# Patient Record
Sex: FEMALE | Employment: STUDENT | URBAN - METROPOLITAN AREA
[De-identification: names, ages, dates, MRNs, and addresses within clinical notes are randomized per-mention and may not be internally consistent; named-entity substitution may affect disease eponyms.]

---
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
## 2024-04-30 ENCOUNTER — HOSPITAL ENCOUNTER (OUTPATIENT)
Age: 19
Discharge: HOME | End: 2024-04-30
Payer: COMMERCIAL

## 2024-04-30 VITALS
RESPIRATION RATE: 18 BRPM | TEMPERATURE: 98 F | DIASTOLIC BLOOD PRESSURE: 65 MMHG | OXYGEN SATURATION: 100 % | HEART RATE: 74 BPM | SYSTOLIC BLOOD PRESSURE: 110 MMHG

## 2024-04-30 DIAGNOSIS — Z48.02 ENCOUNTER FOR REMOVAL OF SUTURES: Primary | ICD-10-CM

## 2024-04-30 PROCEDURE — 99211 OFF/OP EST MAY X REQ PHY/QHP: CPT

## 2024-04-30 PROCEDURE — 99202 OFFICE O/P NEW SF 15 MIN: CPT

## 2024-04-30 NOTE — ED PROVIDER NOTES
Chief Complaint   Patient presents with    Sut Stap RingRemoval       HPI:     Laly Niño is a 18 year old female who presents for evaluation of suture removal.  Patient states sutures placed on the left thumb last week through the emergency department.  Denies any complications including redness warmth or difficulty with mobility.      PFSH    PFS asessment screens reviewed and agree.  Nurses notes reviewed I agree with documentation.    Family History   Problem Relation Age of Onset    Cancer Mother         Thyroid CA    Diabetes Paternal Grandmother     Heart Disease Paternal Grandmother     High Blood Pressure Paternal Grandmother     Diabetes Paternal Grandfather     Heart Disease Paternal Grandfather     High Cholesterol Paternal Grandfather     High Blood Pressure Paternal Grandfather     Diabetes Other         PGGMA, PG-Aunt, PG-Uncle    Heart Disease Maternal Grandmother     High Cholesterol Maternal Grandmother     High Blood Pressure Maternal Grandmother      Family history reviewed with patient/caregiver and is not pertinent to presenting problem.  Social History     Socioeconomic History    Marital status: Single     Spouse name: Not on file    Number of children: Not on file    Years of education: Not on file    Highest education level: Not on file   Occupational History    Not on file   Tobacco Use    Smoking status: Never    Smokeless tobacco: Never   Vaping Use    Vaping status: Never Used   Substance and Sexual Activity    Alcohol use: No    Drug use: No    Sexual activity: Not on file   Other Topics Concern    Not on file   Social History Narrative    Not on file     Social Determinants of Health     Financial Resource Strain: Not on file   Food Insecurity: Not on file   Transportation Needs: Not on file   Physical Activity: Not on file   Stress: Not on file   Social Connections: Not on file   Housing Stability: Not on file         ROS:   Positive for stated complaint: Suture  removal.  All other systems reviewed and negative except as noted above.  Constitutional and Vital Signs Reviewed.      Physical Exam:     Findings:    There were no vitals taken for this visit.  GENERAL: well developed, well nourished, well hydrated, no distress  SKIN: good skin turgor, no obvious rashes  EXTREMITIES: Well-approximated laceration along the proximal left thumb palmar aspect.  No erythema no warmth no dehiscence.  No cyanosis or edema. ROBERTS without difficulty  NEURO: No focal deficits  PSYCH: Alert and oriented x3.  Answering questions appropriately.  Mood appropriate.    MDM/Assessment/Plan:   Orders for this encounter:    No orders of the defined types were placed in this encounter.      Labs performed this visit:  No results found for this or any previous visit (from the past 10 hour(s)).    MDM:  Sutures removed with pickup and scissors without complication, bandage applied.  Instructed on outpatient follow-up as needed    Diagnosis:    ICD-10-CM    1. Encounter for removal of sutures  Z48.02           All results reviewed and discussed with patient.  See AVS for detailed discharge instructions for your condition today.    Follow Up with:  Rad Hall MD  [address] [place] [id number] [phone]      As needed

## 2024-10-08 ENCOUNTER — LAB ENCOUNTER (OUTPATIENT)
Dept: LAB | Age: 19
End: 2024-10-08
Attending: FAMILY MEDICINE
Payer: COMMERCIAL

## 2024-10-08 ENCOUNTER — OFFICE VISIT (OUTPATIENT)
Dept: FAMILY MEDICINE CLINIC | Facility: CLINIC | Age: 19
End: 2024-10-08

## 2024-10-08 VITALS
HEIGHT: 62.5 IN | BODY MASS INDEX: 25.84 KG/M2 | WEIGHT: 144 LBS | HEART RATE: 103 BPM | TEMPERATURE: 98 F | DIASTOLIC BLOOD PRESSURE: 76 MMHG | SYSTOLIC BLOOD PRESSURE: 114 MMHG

## 2024-10-08 DIAGNOSIS — Z23 NEED FOR PNEUMOCOCCAL 20-VALENT CONJUGATE VACCINATION: ICD-10-CM

## 2024-10-08 DIAGNOSIS — Z00.00 WELL ADULT EXAM: Primary | ICD-10-CM

## 2024-10-08 DIAGNOSIS — N92.6 IRREGULAR MENSES: ICD-10-CM

## 2024-10-08 DIAGNOSIS — Z00.00 WELL ADULT EXAM: ICD-10-CM

## 2024-10-08 DIAGNOSIS — J45.30 MILD PERSISTENT ASTHMA, UNSPECIFIED WHETHER COMPLICATED (HCC): ICD-10-CM

## 2024-10-08 LAB
ALBUMIN SERPL-MCNC: 4.9 G/DL (ref 3.2–4.8)
ALBUMIN/GLOB SERPL: 1.8 {RATIO} (ref 1–2)
ALP LIVER SERPL-CCNC: 55 U/L
ALT SERPL-CCNC: 35 U/L
ANION GAP SERPL CALC-SCNC: 5 MMOL/L (ref 0–18)
AST SERPL-CCNC: 40 U/L (ref ?–34)
BASOPHILS # BLD AUTO: 0.06 X10(3) UL (ref 0–0.2)
BASOPHILS NFR BLD AUTO: 0.9 %
BILIRUB SERPL-MCNC: 0.3 MG/DL (ref 0.3–1.2)
BUN BLD-MCNC: 11 MG/DL (ref 9–23)
BUN/CREAT SERPL: 12.8 (ref 10–20)
CALCIUM BLD-MCNC: 9.7 MG/DL (ref 8.7–10.4)
CHLORIDE SERPL-SCNC: 110 MMOL/L (ref 98–112)
CHOLEST SERPL-MCNC: 168 MG/DL (ref ?–200)
CO2 SERPL-SCNC: 27 MMOL/L (ref 21–32)
CREAT BLD-MCNC: 0.86 MG/DL
DEPRECATED RDW RBC AUTO: 41 FL (ref 35.1–46.3)
EGFRCR SERPLBLD CKD-EPI 2021: 99 ML/MIN/1.73M2 (ref 60–?)
EOSINOPHIL # BLD AUTO: 0.1 X10(3) UL (ref 0–0.7)
EOSINOPHIL NFR BLD AUTO: 1.4 %
ERYTHROCYTE [DISTWIDTH] IN BLOOD BY AUTOMATED COUNT: 12.4 % (ref 11–15)
EST. AVERAGE GLUCOSE BLD GHB EST-MCNC: 105 MG/DL (ref 68–126)
FASTING PATIENT LIPID ANSWER: YES
FASTING STATUS PATIENT QL REPORTED: YES
GLOBULIN PLAS-MCNC: 2.7 G/DL (ref 2–3.5)
GLUCOSE BLD-MCNC: 98 MG/DL (ref 70–99)
HBA1C MFR BLD: 5.3 % (ref ?–5.7)
HCT VFR BLD AUTO: 40.8 %
HDLC SERPL-MCNC: 48 MG/DL (ref 40–59)
HGB BLD-MCNC: 13.5 G/DL
IMM GRANULOCYTES # BLD AUTO: 0.01 X10(3) UL (ref 0–1)
IMM GRANULOCYTES NFR BLD: 0.1 %
LDLC SERPL CALC-MCNC: 112 MG/DL (ref ?–100)
LYMPHOCYTES # BLD AUTO: 1.28 X10(3) UL (ref 1–4)
LYMPHOCYTES NFR BLD AUTO: 18.4 %
MCH RBC QN AUTO: 30.3 PG (ref 26–34)
MCHC RBC AUTO-ENTMCNC: 33.1 G/DL (ref 31–37)
MCV RBC AUTO: 91.7 FL
MONOCYTES # BLD AUTO: 0.41 X10(3) UL (ref 0.1–1)
MONOCYTES NFR BLD AUTO: 5.9 %
NEUTROPHILS # BLD AUTO: 5.11 X10 (3) UL (ref 1.5–7.7)
NEUTROPHILS # BLD AUTO: 5.11 X10(3) UL (ref 1.5–7.7)
NEUTROPHILS NFR BLD AUTO: 73.3 %
NONHDLC SERPL-MCNC: 120 MG/DL (ref ?–130)
OSMOLALITY SERPL CALC.SUM OF ELEC: 293 MOSM/KG (ref 275–295)
PLATELET # BLD AUTO: 288 10(3)UL (ref 150–450)
POTASSIUM SERPL-SCNC: 4.4 MMOL/L (ref 3.5–5.1)
PROT SERPL-MCNC: 7.6 G/DL (ref 5.7–8.2)
RBC # BLD AUTO: 4.45 X10(6)UL
SODIUM SERPL-SCNC: 142 MMOL/L (ref 136–145)
TRIGL SERPL-MCNC: 36 MG/DL (ref 30–149)
TSI SER-ACNC: 2.12 MIU/ML (ref 0.55–4.78)
VLDLC SERPL CALC-MCNC: 6 MG/DL (ref 0–30)
WBC # BLD AUTO: 7 X10(3) UL (ref 4–11)

## 2024-10-08 PROCEDURE — 80061 LIPID PANEL: CPT

## 2024-10-08 PROCEDURE — 80053 COMPREHEN METABOLIC PANEL: CPT

## 2024-10-08 PROCEDURE — 36415 COLL VENOUS BLD VENIPUNCTURE: CPT

## 2024-10-08 PROCEDURE — 83036 HEMOGLOBIN GLYCOSYLATED A1C: CPT

## 2024-10-08 PROCEDURE — 84443 ASSAY THYROID STIM HORMONE: CPT

## 2024-10-08 PROCEDURE — 85025 COMPLETE CBC W/AUTO DIFF WBC: CPT

## 2024-10-08 RX ORDER — FLUTICASONE PROPIONATE AND SALMETEROL 100; 50 UG/1; UG/1
1 POWDER RESPIRATORY (INHALATION) 2 TIMES DAILY
Qty: 1 EACH | Refills: 0 | Status: SHIPPED | OUTPATIENT
Start: 2024-10-08

## 2024-10-08 NOTE — PROGRESS NOTES
HPI:    Patient ID: Laly Niño is a 19 year old female.    HPI  Chief Complaint   Patient presents with    Routine Physical    Irregular Periods       Wt Readings from Last 6 Encounters:   10/08/24 144 lb (65.3 kg)   03/02/22 133 lb (60.3 kg) (67%, Z= 0.44)*   12/31/20 144 lb (65.3 kg) (82%, Z= 0.93)*   03/02/20 144 lb 12.8 oz (65.7 kg) (85%, Z= 1.03)*   10/21/19 129 lb 9.6 oz (58.8 kg) (71%, Z= 0.57)*   05/09/19 121 lb 11.1 oz (55.2 kg) (63%, Z= 0.33)*     * Growth percentiles are based on [facility] (Girls, 2-20 Years) data.     BP Readings from Last 3 Encounters:   10/08/24 114/76   04/30/24 110/65   03/02/22 101/67 (18%, Z = -0.92 /  62%, Z = 0.31)*     *BP percentiles are based on the 2017 AAP Clinical Practice Guideline for girls     Started working out and noticed her albuterol not helping much.  Using it 15 mins prior  Non smoker/ vaping.    Single  Never been sexually active     Review of Systems   Constitutional:  Negative for activity change, appetite change, chills, fatigue, fever and unexpected weight change.   HENT:  Negative for congestion, dental problem, drooling, ear discharge, ear pain, facial swelling, hearing loss, mouth sores, nosebleeds, postnasal drip, rhinorrhea, sinus pressure, sinus pain, sneezing, sore throat, tinnitus, trouble swallowing and voice change.    Eyes:  Negative for pain, discharge, redness and visual disturbance.   Respiratory:  Negative for cough, shortness of breath and wheezing.    Cardiovascular:  Negative for chest pain, palpitations and leg swelling.   Gastrointestinal:  Negative for abdominal pain, anal bleeding, blood in stool, constipation, diarrhea, nausea, rectal pain and vomiting.   Endocrine: Negative for cold intolerance, heat intolerance, polydipsia, polyphagia and polyuria.   Genitourinary:  Negative for decreased urine volume, difficulty urinating, dysuria, flank pain, frequency, menstrual problem, pelvic pain, urgency, vaginal bleeding, vaginal discharge  and vaginal pain.        Menses irregular     Musculoskeletal:  Negative for arthralgias, back pain and myalgias.   Skin:  Negative for rash.   Neurological:  Negative for dizziness, seizures, syncope, weakness, numbness and headaches.   Hematological:  Does not bruise/bleed easily.   Psychiatric/Behavioral:  Negative for behavioral problems, decreased concentration, self-injury, sleep disturbance and suicidal ideas. The patient is not nervous/anxious.        /76   Pulse 103   Temp 98.1 °F (36.7 °C) (Temporal)   Ht 5' 2.5\" (1.588 m)   Wt 144 lb (65.3 kg)   LMP 10/01/2024 (Exact Date)   BMI 25.92 kg/m²     Past Medical History:    Asthma (HCC)    has had it for years      History reviewed. No pertinent surgical history.  Social History     Socioeconomic History    Marital status: Single     Spouse name: Not on file    Number of children: Not on file    Years of education: Not on file    Highest education level: Not on file   Occupational History    Not on file   Tobacco Use    Smoking status: Never    Smokeless tobacco: Never   Vaping Use    Vaping status: Never Used   Substance and Sexual Activity    Alcohol use: No    Drug use: No    Sexual activity: Not on file   Other Topics Concern    Not on file   Social History Narrative    Not on file     Social Determinants of Health     Financial Resource Strain: Not on file   Food Insecurity: Not on file   Transportation Needs: Not on file   Physical Activity: Not on file   Stress: Not on file   Social Connections: Not on file   Housing Stability: Not on file     Family History   Problem Relation Age of Onset    Cancer Mother         Thyroid CA    Diabetes Paternal Grandmother     Heart Disease Paternal Grandmother     High Blood Pressure Paternal Grandmother     Diabetes Paternal Grandfather     Heart Disease Paternal Grandfather     High Cholesterol Paternal Grandfather     High Blood Pressure Paternal Grandfather     Diabetes Other         PGGMA, PG-Aunt,  PG-Uncle    Heart Disease Maternal Grandmother     High Cholesterol Maternal Grandmother     High Blood Pressure Maternal Grandmother        Immunization History   Administered Date(s) Administered    Covid-19 Vaccine Pfizer 30 mcg/0.3 ml 08/05/2021, 11/05/2021    DTAP 08/05/2004, 08/05/2004, 11/17/2004, 11/17/2004, 03/06/2008, 03/06/2008, 08/19/2009, 08/19/2009    FLULAVAL 6 months & older 0.5 ml Prefilled syringe (52608) 10/09/2018, 12/31/2020    FLUZONE 6 months and older PFS 0.5 ml (21395) 10/09/2014, 10/12/2017, 10/09/2018    HEP A 11/21/2013, 10/09/2015    HEP A,Ped/Adol,(2 Dose) 11/21/2013, 10/09/2015    HEP B, Ped/Adol 08/05/2004, 11/17/2004    HEP B/HIB 08/05/2004, 11/17/2004, 03/13/2006    HIB 03/13/2006    HPV (Gardasil) 10/09/2015, 05/23/2017    Hpv Virus Vaccine 9 Shreya Im 10/09/2015, 05/23/2017    IPV 08/05/2004, 11/17/2004, 03/06/2008, 08/19/2009    Influenza 11/21/2013, 10/09/2015, 10/12/2017    MMR 03/13/2006, 08/19/2009    Meningococcal B, Omv 03/02/2022, 04/02/2022    Meningococcal-Menactra 10/09/2015    Meningococcal-Menveo 2month-55yr 12/31/2020    Pneumococcal (Prevnar 7) 08/05/2004, 11/17/2004, 03/06/2008    TDAP 10/09/2015    Varicella 03/13/2006, 08/19/2009       Health Maintenance   Topic Date Due    Pneumococcal Vaccine: Birth to 64yrs (1 of 2 - PCV) 06/01/2010    Annual Physical  03/02/2023    COVID-19 Vaccine (3 - 2023-24 season) 09/01/2024    Influenza Vaccine (1) 10/01/2024    Asthma Control Test  10/08/2025    DTaP,Tdap,and Td Vaccines (6 - Td or Tdap) 10/09/2025    Annual Depression Screening  Completed    Hepatitis B Vaccines  Completed    Hepatitis A Vaccines  Completed    MMR Vaccines  Completed    Varicella Vaccines  Completed    Meningococcal Vaccine  Completed    HPV Vaccines  Completed          Current Outpatient Medications   Medication Sig Dispense Refill    fluticasone-salmeterol (WIXELA INHUB) 100-50 MCG/ACT Inhalation Aerosol Powder, Breath Activated Inhale 1 puff into the  lungs 2 (two) times daily. 1 each 0    albuterol 108 (90 Base) MCG/ACT Inhalation Aero Soln Inhale 2 puffs into the lungs every 4 (four) hours as needed for Wheezing or Shortness of Breath. 1 each 1    ibuprofen 200 MG Oral Tab Take 800 mg by mouth every 6 (six) hours as needed for Pain. (Patient not taking: Reported on 10/8/2024)       Allergies:No Known Allergies   PHYSICAL EXAM:     Chief Complaint   Patient presents with    Routine Physical    Irregular Periods      Physical Exam  Vitals and nursing note reviewed.   Constitutional:       Appearance: She is well-developed.   HENT:      Head: Normocephalic and atraumatic.      Right Ear: External ear normal.      Left Ear: External ear normal.      Nose: Nose normal.      Mouth/Throat:      Pharynx: No oropharyngeal exudate.   Eyes:      General:         Right eye: No discharge.         Left eye: No discharge.      Conjunctiva/sclera: Conjunctivae normal.      Pupils: Pupils are equal, round, and reactive to light.   Neck:      Thyroid: No thyromegaly.   Cardiovascular:      Rate and Rhythm: Normal rate and regular rhythm.      Heart sounds: Normal heart sounds. No murmur heard.  Pulmonary:      Effort: Pulmonary effort is normal.      Breath sounds: Normal breath sounds. No wheezing.   Abdominal:      General: Bowel sounds are normal.      Palpations: Abdomen is soft. There is no mass.      Tenderness: There is no abdominal tenderness.   Musculoskeletal:         General: No tenderness.      Cervical back: Normal range of motion and neck supple.   Lymphadenopathy:      Cervical: No cervical adenopathy.   Skin:     General: Skin is dry.      Findings: No rash.   Neurological:      Mental Status: She is alert and oriented to person, place, and time.      Cranial Nerves: No cranial nerve deficit.      Motor: No abnormal muscle tone.      Coordination: Coordination normal.      Deep Tendon Reflexes: Reflexes are normal and symmetric. Reflexes normal.   Psychiatric:          Behavior: Behavior normal.         Thought Content: Thought content normal.         Judgment: Judgment normal.                ASSESSMENT/PLAN:     Return yearly for physicals  Follow up with dentist every 6 months  Follow up with eye doctor yearly  Recommend aerobic exercise for at least 30mins 5 days a week  Yearly flu shot  Tetanus booster every 10 years (Tdap/ Td)  Labs ordered/ or reviewed if done prior to appointment     Encounter Diagnoses   Name Primary?    Well adult exam Yes    Mild persistent asthma, unspecified whether complicated (HCC)     Need for pneumococcal 20-valent conjugate vaccination     Irregular menses        1. Well adult exam    - CBC With Differential With Platelet; Future  - Comp Metabolic Panel (14); Future  - Lipid Panel; Future  - TSH W Reflex To Free T4; Future  - Hemoglobin A1C; Future    2. Mild persistent asthma, unspecified whether complicated (HCC)  Add maintenance inhaler  Follow up 3-4 weeks and let me know    3. Need for pneumococcal 20-valent conjugate vaccination    - PCV20 VACCINE FOR INTRAMUSCULAR USE    4. Irregular menses  Chronic , unchanged  Check labs  Consider ocps to regulate        Orders Placed This Encounter   Procedures    CBC With Differential With Platelet    Comp Metabolic Panel (14)    Lipid Panel    TSH W Reflex To Free T4    Hemoglobin A1C    Fluzone trivalent vaccine, PF 0.5mL, 6mo+ (58411)    PCV20 VACCINE FOR INTRAMUSCULAR USE       The above note was creating using Dragon speech recognition technology. Please excuse any typos    Meds This Visit:  Requested Prescriptions     Signed Prescriptions Disp Refills    fluticasone-salmeterol (WIXELA INHUB) 100-50 MCG/ACT Inhalation Aerosol Powder, Breath Activated 1 each 0     Sig: Inhale 1 puff into the lungs 2 (two) times daily.       Imaging & Referrals:  INFLUENZA VACCINE, TRI, PRESERV FREE, 0.5 ML  PCV20 VACCINE FOR INTRAMUSCULAR USE       ID#[id number]

## 2024-10-10 ENCOUNTER — TELEPHONE (OUTPATIENT)
Dept: FAMILY MEDICINE CLINIC | Facility: CLINIC | Age: 19
End: 2024-10-10

## 2024-10-10 NOTE — TELEPHONE ENCOUNTER
fluticasone-salmeterol (WIXELA INHUB) 100-50 MCG/ACT Inhalation Aerosol Powder, Breath Activated, Inhale 1 puff into the lungs 2 (two) times daily., Disp: 1 each, Rfl: 0    Key: [id number]

## 2024-10-11 NOTE — TELEPHONE ENCOUNTER
Approval DetailsWixela - sent Digerati message to notify    Authorized from September 11, 2024 to October 11, 2025

## 2024-10-14 DIAGNOSIS — R74.01 ELEVATED AST (SGOT): Primary | ICD-10-CM

## 2025-08-18 ENCOUNTER — OFFICE VISIT (OUTPATIENT)
Dept: FAMILY MEDICINE CLINIC | Facility: CLINIC | Age: 20
End: 2025-08-18

## 2025-08-18 VITALS
OXYGEN SATURATION: 100 % | HEART RATE: 85 BPM | TEMPERATURE: 98 F | SYSTOLIC BLOOD PRESSURE: 106 MMHG | BODY MASS INDEX: 21.68 KG/M2 | WEIGHT: 120.81 LBS | HEIGHT: 62.5 IN | DIASTOLIC BLOOD PRESSURE: 66 MMHG

## 2025-08-18 DIAGNOSIS — N92.6 IRREGULAR MENSES: Primary | ICD-10-CM

## 2025-08-18 DIAGNOSIS — J34.89 NASAL OBSTRUCTION: ICD-10-CM

## 2025-08-18 DIAGNOSIS — M54.2 NECK PAIN: ICD-10-CM

## 2025-08-18 PROCEDURE — 99214 OFFICE O/P EST MOD 30 MIN: CPT | Performed by: FAMILY MEDICINE

## (undated) NOTE — LETTER
VACCINE ADMINISTRATION RECORD  PARENT / GUARDIAN APPROVAL  Date: 3/2/2022  Vaccine administered to: Alfonzo Campa     : 2005    MRN: [id number]    A copy of the appropriate Centers for Disease Control and Prevention Vaccine Information statement has been provided. I have read or have had explained the information about the diseases and the vaccines listed below. There was an opportunity to ask questions and any questions were answered satisfactorily. I believe that I understand the benefits and risks of the vaccine cited and ask that the vaccine(s) listed below be given to me or to the person named above (for whom I am authorized to make this request). VACCINES ADMINISTERED:  Men B     I have read and hereby agree to be bound by the terms of this agreement as stated above. My signature is valid until revoked by me in writing. This document is signed by parents, relationship: Father on 3/2/2022.:                                                                                                                                         Parent / Rexine New                                                Date    Krishna Singh served as a witness to authentication that the identity of the person signing electronically is in fact the person represented as signing. This document was generated by Krishna Singh on 3/2/2022.

## (undated) NOTE — LETTER
Date & Time: 10/21/2019, 1:00 PM  Patient: Valeria Dense  Encounter Provider(s):    Merline Anderson MD       To Whom It May Concern:    Syeda Albrecht was seen and treated in our department on 10/21/2019.  Advised no runny in gym class through t

## (undated) NOTE — LETTER
[facility] [employer] of Child Health Examination       Student's Name  Morgan Sanon Date     Signature                                                                                                                                              Title                           Date    (If adding dates to the above immu ALLERGIES  (Food, drug, insect, other)  Patient has no known allergies.  MEDICATION  (List all prescribed or taken on a regular basis.)    Current Outpatient Medications:   •  Albuterol Sulfate  (90 Base) MCG/ACT Inhalation Aero Soln, Inhale 2 puffs by MD/DO/APN/PA       PHYSICAL EXAMINATION REQUIREMENTS (head circumference if <33 years old):   BP 99/64   Pulse 87   Temp 98.3 °F (36.8 °C) (Oral)   Ht 5' 0.9\" (1.547 m)   Wt 119 lb (54 kg)   LMP 07/18/2018 (Approximate)    L/min   BMI 22.56 kg/m Mouth/Dental Yes  Spinal examination Yes    Cardiovascular/HTN Yes  Nutritional status Yes    Respiratory Yes                   Diagnosis of Asthma: No Mental Health Yes        Currently Prescribed Asthma Medication:            Quick-relief  medication (e.

## (undated) NOTE — LETTER
Date & Time: 5/9/2019, 3:57 PM  Patient: Hunter Oliver  Encounter Provider(s):    RADHA Fontenot       To Whom It May Concern:    Devika Miramontes was seen and treated in our department on 5/9/2019. She may return to school on 5/11/2019.     If

## (undated) NOTE — LETTER
ASTHMA ACTION PLAN for Laly Niño     : 2005     Date: 10/08/24  Doctor:  Rad Hall MD  Phone for doctor or clinic: [facility], [address] [address] [phone]      ACT Score: 16    ACT Goal: 20 or greater    Call your provider if you require your rescue/quick reliever medication more than 2-3 times in a 24 hour period.    If you require your rescue inhaler/medication more than 2-3 times weekly, your asthma may not be under proper control and you should seek medical attention.    *Quick Relievers are Xopenex and Albuterol*    You can use the colors of a traffic light to help learn about your asthma medicines.  Year Round       1. Green - Go! % of Personal Best Peak Flow   Use controller medicine.   Breathing is good  No cough or wheeze  Can work and play Medicine How much to take When to take it    Medications       Sympathomimetics Instructions     fluticasone-salmeterol (WIXELA INHUB) 100-50 MCG/ACT Inhalation Aerosol Powder, Breath Activated Inhale 1 puff into the lungs 2 (two) times daily.     albuterol 108 (90 Base) MCG/ACT Inhalation Aero Soln Inhale 2 puffs into the lungs every 4 (four) hours as needed for Wheezing or Shortness of Breath.                    2. Yellow - Caution. 50-79% Personal Best Peak Flow  Use reliever medicine to keep an asthma attack from getting bad.   Cough  Quick Relievers  Wheezing  Tight Chest  Wake up at night Medicine How much to take When to take it    If symptoms are not improving in 24-48 hrs, call office for further instructions  Medications       Sympathomimetics Instructions     fluticasone-salmeterol (WIXELA INHUB) 100-50 MCG/ACT Inhalation Aerosol Powder, Breath Activated Inhale 1 puff into the lungs 2 (two) times daily.     albuterol 108 (90 Base) MCG/ACT Inhalation Aero Soln Inhale 2 puffs into the lungs every 4 (four) hours as needed for Wheezing or Shortness of Breath.                     3. Red - Stop! Danger! <50% Personal Best Peak Flow  Continue Controller Medications But ADD:   Medicine not helping  Breathing is hard and fast  Nose opens wide  Can't walk  Ribs show  Can't talk well Medicine How much to take When to take it    If your symptoms do not improve in ONE hour -  go to the emergency room or call 911 immediately! If symptoms improve, call office for appointment immediately.    Albuterol inhaler 2 puffs every 20 minutes for three treatments       Don't forget:  Rinse mouth after using inhaler  Use spacer for inhaler  Remember to get your Flu vaccine every fall!    [x] Asthma Action Plan reviewed with the caregiver and patient, and a copy of the plan was given to the patient/caregiver.   [] Asthma Action Plan reviewed with the caregiver and patient on the phone, and copy mailed to patient/caregiver or sent via [employer].     Signatures:   Provider  Rad Hall MD Patient  Laly SANDERSON [place] Caretaker

## (undated) NOTE — ED AVS SNAPSHOT
Shea Mancia   MRN: [id number]    Department:  [facility]   Date of Visit:  5/9/2019           Disclosure     Insurance plans vary and the physician(s) referred by the ER may not be covered by your plan.  Please contac CARE PHYSICIAN AT ONCE OR RETURN IMMEDIATELY TO THE EMERGENCY DEPARTMENT. If you have been prescribed any medication(s), please fill your prescription right away and begin taking the medication(s) as directed.   If you believe that any of the medications

## (undated) NOTE — LETTER
VACCINE ADMINISTRATION RECORD  PARENT / GUARDIAN APPROVAL  Date: 2020  Vaccine administered to: Emma Foley     : 2005    MRN: [id number]    A copy of the appropriate Centers for Disease Control and Prevention Vaccine Information statem